# Patient Record
Sex: FEMALE
[De-identification: names, ages, dates, MRNs, and addresses within clinical notes are randomized per-mention and may not be internally consistent; named-entity substitution may affect disease eponyms.]

---

## 2019-07-14 ENCOUNTER — HOSPITAL ENCOUNTER (EMERGENCY)
Dept: HOSPITAL 57 - BURERS | Age: 39
Discharge: HOME | End: 2019-07-14
Payer: SELF-PAY
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
## 2019-07-14 DIAGNOSIS — S13.4XXA: Primary | ICD-10-CM

## 2019-07-14 PROCEDURE — 72125 CT NECK SPINE W/O DYE: CPT

## 2019-07-14 PROCEDURE — G0390 TRAUMA RESPONS W/HOSP CRITI: HCPCS

## 2019-07-14 NOTE — CT
CERVCAL SPINE CT NONCONTRAST:

 

Date:  07/14/19 

 

CLINICAL HISTORY:  

Neck injury with pain. 

 

FINDINGS:

There is reversal of normal cervical curvature which may be positional. Beam attenuation does limit v
isualization at the level of the cervical spine/cervicothoracic junction. The craniocervical junction
 is intact. No evidence of compression fracture or subluxation. Disc space heights are relatively wel
l preserved. There is no acute facet malalignment. 

 

IMPRESSION: 

No acute osseous abnormality of the cervical spine. 

 

 

POS: JOCE